# Patient Record
Sex: FEMALE | Race: WHITE | NOT HISPANIC OR LATINO
[De-identification: names, ages, dates, MRNs, and addresses within clinical notes are randomized per-mention and may not be internally consistent; named-entity substitution may affect disease eponyms.]

---

## 2022-10-18 PROBLEM — Z00.00 ENCOUNTER FOR PREVENTIVE HEALTH EXAMINATION: Status: ACTIVE | Noted: 2022-10-18

## 2022-10-20 ENCOUNTER — APPOINTMENT (OUTPATIENT)
Dept: ORTHOPEDIC SURGERY | Facility: CLINIC | Age: 77
End: 2022-10-20

## 2022-10-20 VITALS — HEIGHT: 64.5 IN | BODY MASS INDEX: 25.13 KG/M2 | WEIGHT: 149 LBS

## 2022-10-20 DIAGNOSIS — E78.00 PURE HYPERCHOLESTEROLEMIA, UNSPECIFIED: ICD-10-CM

## 2022-10-20 DIAGNOSIS — Z86.39 PERSONAL HISTORY OF OTHER ENDOCRINE, NUTRITIONAL AND METABOLIC DISEASE: ICD-10-CM

## 2022-10-20 DIAGNOSIS — I10 ESSENTIAL (PRIMARY) HYPERTENSION: ICD-10-CM

## 2022-10-20 PROCEDURE — 99214 OFFICE O/P EST MOD 30 MIN: CPT | Mod: 1L

## 2022-10-20 PROCEDURE — 99072 ADDL SUPL MATRL&STAF TM PHE: CPT | Mod: 1L

## 2022-10-20 RX ORDER — FAMOTIDINE 20 MG/1
20 TABLET, FILM COATED ORAL
Refills: 0 | Status: ACTIVE | COMMUNITY

## 2022-10-20 RX ORDER — ATORVASTATIN CALCIUM 10 MG/1
10 TABLET, FILM COATED ORAL
Refills: 0 | Status: ACTIVE | COMMUNITY

## 2022-10-20 RX ORDER — AMLODIPINE BESYLATE 5 MG/1
5 TABLET ORAL
Refills: 0 | Status: ACTIVE | COMMUNITY

## 2022-10-20 RX ORDER — HYDROXYZINE HYDROCHLORIDE 50 MG/1
50 TABLET ORAL
Refills: 0 | Status: ACTIVE | COMMUNITY

## 2022-10-20 RX ORDER — METHOCARBAMOL 500 MG/1
500 TABLET, FILM COATED ORAL
Refills: 0 | Status: ACTIVE | COMMUNITY

## 2022-10-20 RX ORDER — LEVOTHYROXINE SODIUM 0.07 MG/1
75 TABLET ORAL
Refills: 0 | Status: ACTIVE | COMMUNITY

## 2022-10-30 ENCOUNTER — FORM ENCOUNTER (OUTPATIENT)
Age: 77
End: 2022-10-30

## 2022-11-17 ENCOUNTER — FORM ENCOUNTER (OUTPATIENT)
Age: 77
End: 2022-11-17
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
## 2022-12-07 NOTE — DATA REVIEWED
[MRI] : MRI [Cervical Spine] : cervical spine [Lumbar Spine] : lumbar spine [Report was reviewed and noted in the chart] : The report was reviewed and noted in the chart [I independently reviewed and interpreted images and report] : I independently reviewed and interpreted images and report [FreeTextEntry1] : Cervical:\par Multilevel cervical spondylosis \par HNP C5-6\par \par Lumbar:\par Multilevel lumbar spondylosis \par left foraminal HNP L2-3\par Central HNP L5-S1\par Left paracentral HNP L4-5

## 2022-12-07 NOTE — ASSESSMENT
[FreeTextEntry1] : Cervical:\par Multilevel cervical spondylosis \par HNP C5-6\par \par Lumbar:\par Multilevel lumbar spondylosis \par left foraminal HNP L2-3\par Central HNP L5-S1\par Left paracentral HNP L4-5

## 2022-12-15 ENCOUNTER — APPOINTMENT (OUTPATIENT)
Dept: ORTHOPEDIC SURGERY | Facility: CLINIC | Age: 77
End: 2022-12-15

## 2022-12-15 PROCEDURE — 99214 OFFICE O/P EST MOD 30 MIN: CPT

## 2022-12-15 PROCEDURE — 99072 ADDL SUPL MATRL&STAF TM PHE: CPT
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
## 2022-12-15 NOTE — ASSESSMENT
[FreeTextEntry1] : Cervical:\par Multilevel cervical spondylosis \par HNP C5-6\par \par Lumbar:\par Multilevel lumbar spondylosis \par left foraminal HNP L2-3\par Central HNP L5-S1\par Left paracentral HNP L4-5\par \par Continue PT \par \par Referral to pain management for injections, follow up 2 weeks after injection. \par \par Recommend: - NSAID - Heating pad - Muscle relaxer - Neck stretching exercise - Soft cervical collar - Cervical traction Patient is given neck rehabilitation exercise book. \par \par Recommend: - NSAID - Heating pad - Muscle relaxer - Core strengthening exercise - Hamstring stretching exercise Patient is given back rehabilitation exercise book. \par \par Follow up in 2 months \par

## 2022-12-20 ENCOUNTER — FORM ENCOUNTER (OUTPATIENT)
Age: 77
End: 2022-12-20

## 2023-01-02 NOTE — DATA REVIEWED
[MRI] : MRI [Cervical Spine] : cervical spine [Report was reviewed and noted in the chart] : The report was reviewed and noted in the chart [I reviewed the films/CD] : I reviewed the films/CD [Lumbar Spine] : lumbar spine

## 2023-01-03 ENCOUNTER — APPOINTMENT (OUTPATIENT)
Dept: PAIN MANAGEMENT | Facility: CLINIC | Age: 78
End: 2023-01-03
Payer: COMMERCIAL

## 2023-01-03 VITALS — WEIGHT: 149 LBS | BODY MASS INDEX: 25.44 KG/M2 | HEIGHT: 64 IN

## 2023-01-03 DIAGNOSIS — M47.817 SPONDYLOSIS W/OUT MYELOPATHY OR RADICULOPATHY, LUMBOSACRAL REGION: ICD-10-CM

## 2023-01-03 PROCEDURE — 99204 OFFICE O/P NEW MOD 45 MIN: CPT

## 2023-01-03 PROCEDURE — 99072 ADDL SUPL MATRL&STAF TM PHE: CPT

## 2023-01-03 NOTE — PHYSICAL EXAM
[de-identified] : Constitutional:  \par - No acute distress  \par - Well developed; well nourished  \par \par Neurological:  \par - normal mood and affect  \par - alert and oriented x 3   \par \par Cardiovascular:  \par - grossly normal \par \par Lumbar Spine Exam: \par \par Inspection: \par erythema (-) \par ecchymosis (-) \par rashes (-) \par alignment: Exaggerated thoracic kyphosis\par \par Palpation: \par Midline lumbar tenderness:            (+) \par midline thoracic tenderness:          (-) \par Lumbar paraspinal tenderness:  L (-) ; R (+) \par thoracic paraspinal tenderness: L (-) ; R (-) \par sciatic nerve tenderness :          L (-) ; R (-) \par SI joint tenderness:                     L (-) ; R (-) \par GTB tenderness:                        L (-);  R (-) \par \par ROM:  Reduced ROM all planes with mild stiffness \par \par Strength: \par                                    Right       Left    \par Hip Flexion:                (5/5)       (5/5) \par Quadriceps:               (5/5)       (5/5) \par Hamstrings:                (5/5)       (5/5) \par Ankle Dorsiflexion:     (5/5)       (5/5) \par EHL:                           (5/5)       (5/5) \par Ankle Plantarflexion:  (5/5)       (5/5) \par \par Special Tests: \par SLR:                            R (EQ) ; L (-) \par Facet loading:             R (+) ; L (-) \par DARIO test:                R (EQ) ; L (-) \par Hamstring tightness:   R (+);  L (+) \par \par Neurologic: \par SILT throughout right lower extremity \par SILT throughout left lower extremity \par \par Reflexes normal and symmetric bilateral lower extremities \par \par Gait: \par Mildly antalgic gait \par ambulates without assistive device
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
## 2023-01-03 NOTE — ASSESSMENT
[FreeTextEntry1] : A discussion regarding available pain management treatment options occurred with the patient.  These included interventional, rehabilitative, pharmacological, and alternative modalities. We will proceed with the following:  \par \par Interventional treatment options:  \par - Proceed with right L4-L5, L5-S1 TFESI with fluoroscopic guidance\par - We will consider facet directed intervention if ongoing axial low back pain\par - Possible right IA hip injection if suspected pain generator\par - see additional instructions below  \par \par Rehabilitative options:  \par - continue physical therapy  \par - participation in active HEP was discussed  \par \par Medication based treatment options:  \par - initiate trial of naproxen OTC BID as needed\par - continue Tylenol 500-1000 mg TID as needed\par - see additional instructions below  \par \par Complementary treatment options:  \par - Weight management and lifestyle modifications discussed  \par - See additional instructions below  \par \par Additional treatment recommendations as follows:  \par - patient with follow up with Dr. Cintron as directed\par - X-ray right hip if ongoing suspicion for intra-articular pain generator\par - Follow up 1-2 weeks post injection for assessment of efficacy and further recommendations.\par \par We have discussed the risks, benefits, and alternatives NSAID therapy including but not limited to the risk of bleeding, thrombosis, gastric mucosal irritation/ulceration, allergic reaction and kidney dysfunction; the patient verbalizes an understanding.\par \par The risks, benefits and alternatives of the proposed procedure were explained in detail with the patient. The risks outlined include but are not limited to infection, bleeding, post- dural puncture headache, nerve injury, a temporary increase in pain, failure to resolve symptoms, allergic reaction, and possible elevation of blood sugar in diabetics if using corticosteroid.  All questions were answered to patient's apparent satisfaction and he/she verbalized an understanding.\par \par I, Lukas Bryan acting as scribe, attest that this documentation has been prepared under the direction and in the presence of Provider Mo Noe DO. \par \par The documentation recorded by the scribe, in my presence, accurately reflects the service I personally performed and the decisions made by me with my edits as appropriate.

## 2023-01-17 ENCOUNTER — FORM ENCOUNTER (OUTPATIENT)
Age: 78
End: 2023-01-17

## 2023-02-06 ENCOUNTER — FORM ENCOUNTER (OUTPATIENT)
Age: 78
End: 2023-02-06

## 2023-02-16 ENCOUNTER — APPOINTMENT (OUTPATIENT)
Dept: ORTHOPEDIC SURGERY | Facility: CLINIC | Age: 78
End: 2023-02-16
Payer: COMMERCIAL

## 2023-02-16 VITALS — WEIGHT: 149 LBS | HEIGHT: 64 IN | BODY MASS INDEX: 25.44 KG/M2

## 2023-02-16 DIAGNOSIS — M47.816 SPONDYLOSIS W/OUT MYELOPATHY OR RADICULOPATHY, LUMBAR REGION: ICD-10-CM

## 2023-02-16 PROCEDURE — 99072 ADDL SUPL MATRL&STAF TM PHE: CPT

## 2023-02-16 PROCEDURE — 99215 OFFICE O/P EST HI 40 MIN: CPT | Mod: 25

## 2023-02-16 PROCEDURE — 20610 DRAIN/INJ JOINT/BURSA W/O US: CPT | Mod: RT

## 2023-02-16 PROCEDURE — 73502 X-RAY EXAM HIP UNI 2-3 VIEWS: CPT

## 2023-02-16 NOTE — IMAGING
[Right] : right hip with pelvis [AP] : anteroposterior [Lateral] : lateral [FreeTextEntry9] : EVETTE

## 2023-02-16 NOTE — ASSESSMENT
[FreeTextEntry1] : Cervical:\par Multilevel cervical spondylosis \par HNP C5-6\par \par Lumbar:\par Multilevel lumbar spondylosis \par left foraminal HNP L2-3\par Central HNP L5-S1\par Left paracentral HNP L4-5\par \par Continue PT \par \par Patient waiting for PM to schedule cervical and lumbar SHAWANDA.\par \par Recommend: - NSAID - Heating pad - Muscle relaxer - Neck stretching exercise - Soft cervical collar - Cervical traction Patient is given neck rehabilitation exercise book. \par \par Recommend: - NSAID - Heating pad - Muscle relaxer - Core strengthening exercise - Hamstring stretching exercise Patient is given back rehabilitation exercise book. \par \par Follow up in 2 months \par

## 2023-02-16 NOTE — PROCEDURE
[Large Joint Injection] : Large joint injection [Right] : of the right [Greater Trochanteric Bursa] : greater trochanteric bursa [Pain] : pain [Inflammation] : inflammation [X-ray evidence of Osteoarthritis on this or prior visit] : x-ray evidence of Osteoarthritis on this or prior visit [Betadine] : betadine [Ethyl Chloride sprayed topically] : ethyl chloride sprayed topically [Sterile technique used] : sterile technique used [___ cc    1%] : Lidocaine ~Vcc of 1%  [___ cc    40mg] : Triamcinolone (Kenalog) ~Vcc of 40 mg  [] : Patient tolerated procedure well [Call if redness, pain or fever occur] : call if redness, pain or fever occur [Apply ice for 15min out of every hour for the next 12-24 hours as tolerated] : apply ice for 15 minutes out of every hour for the next 12-24 hours as tolerated [Patient was advised to rest the joint(s) for ____ days] : patient was advised to rest the joint(s) for [unfilled] days [Previous OTC use and PT nontherapeutic] : patient has tried OTC's including aspirin, Ibuprofen, Aleve, etc or prescription NSAIDS, and/or exercises at home and/or physical therapy without satisfactory response [Patient had decreased mobility in the joint] : patient had decreased mobility in the joint [Risks, benefits, alternatives discussed / Verbal consent obtained] : the risks benefits, and alternatives have been discussed, and verbal consent was obtained
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
## 2023-03-27 ENCOUNTER — FORM ENCOUNTER (OUTPATIENT)
Age: 78
End: 2023-03-27

## 2023-05-12 ENCOUNTER — APPOINTMENT (OUTPATIENT)
Dept: ORTHOPEDIC SURGERY | Facility: CLINIC | Age: 78
End: 2023-05-12
Payer: COMMERCIAL

## 2023-05-12 VITALS — HEIGHT: 64 IN | WEIGHT: 149 LBS | BODY MASS INDEX: 25.44 KG/M2

## 2023-05-12 PROCEDURE — 99215 OFFICE O/P EST HI 40 MIN: CPT
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
## 2023-05-12 NOTE — ASSESSMENT
[FreeTextEntry1] : Cervical:\par Multilevel cervical spondylosis \par HNP C5-6\par \par Lumbar:\par Multilevel lumbar spondylosis \par left foraminal HNP L2-3\par Central HNP L5-S1\par Left paracentral HNP L4-5\par \par Continue PT\par \par Recommend cervical SHAWANDA  \par \par Recommend: - NSAID - Heating pad - Muscle relaxer - Neck stretching exercise - Soft cervical collar - Cervical traction Patient is given neck rehabilitation exercise book. \par \par Recommend: - NSAID - Heating pad - Muscle relaxer - Core strengthening exercise - Hamstring stretching exercise Patient is given back rehabilitation exercise book. \par \par Follow up in 2 months \par

## 2023-08-11 ENCOUNTER — APPOINTMENT (OUTPATIENT)
Dept: ORTHOPEDIC SURGERY | Facility: CLINIC | Age: 78
End: 2023-08-11
Payer: COMMERCIAL

## 2023-08-11 VITALS — HEIGHT: 64 IN | WEIGHT: 149 LBS | BODY MASS INDEX: 25.44 KG/M2

## 2023-08-11 DIAGNOSIS — S13.9XXA SPRAIN OF JOINTS AND LIGAMENTS OF UNSPECIFIED PARTS OF NECK, INITIAL ENCOUNTER: ICD-10-CM

## 2023-08-11 DIAGNOSIS — S33.5XXA SPRAIN OF LIGAMENTS OF LUMBAR SPINE, INITIAL ENCOUNTER: ICD-10-CM

## 2023-08-11 PROCEDURE — 99214 OFFICE O/P EST MOD 30 MIN: CPT

## 2023-08-11 RX ORDER — DICLOFENAC SODIUM 1% 10 MG/G
1 GEL TOPICAL 4 TIMES DAILY
Qty: 1 | Refills: 0 | Status: ACTIVE | COMMUNITY
Start: 2023-08-11 | End: 1900-01-01

## 2023-08-11 NOTE — ASSESSMENT
[FreeTextEntry1] : Cervical: Multilevel cervical spondylosis  HNP C5-6  Lumbar: Multilevel lumbar spondylosis  Left foraminal HNP L2-3 Central HNP L5-S1 Left paracentral HNP L4-5  Continue HEP  Continue Acupuncture treatment.  Recommend: - NSAID - Heating pad - Muscle relaxer - Neck stretching exercise - Soft cervical collar - Cervical traction Patient is given neck rehabilitation exercise book.   Recommend: - NSAID - Heating pad - Muscle relaxer - Core strengthening exercise - Hamstring stretching exercise Patient is given back rehabilitation exercise book.   Follow up in 2 months
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
## 2023-10-12 ENCOUNTER — APPOINTMENT (OUTPATIENT)
Dept: ORTHOPEDIC SURGERY | Facility: CLINIC | Age: 78
End: 2023-10-12
Payer: COMMERCIAL

## 2023-10-12 VITALS — HEIGHT: 64 IN | BODY MASS INDEX: 25.44 KG/M2 | WEIGHT: 149 LBS

## 2023-10-12 PROCEDURE — 99215 OFFICE O/P EST HI 40 MIN: CPT

## 2023-12-14 ENCOUNTER — APPOINTMENT (OUTPATIENT)
Dept: ORTHOPEDIC SURGERY | Facility: CLINIC | Age: 78
End: 2023-12-14
Payer: COMMERCIAL

## 2023-12-14 PROCEDURE — 99214 OFFICE O/P EST MOD 30 MIN: CPT
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
## 2023-12-14 NOTE — ASSESSMENT
[FreeTextEntry1] : MRI of C-Spine was reviewed today and is as follows: Multilevel cervical spondylosis HNP C5-6  MRI of L-Spine was reviewed today and is as follows: Multilevel lumbar spondylosis Left foraminal HNP L2-3 Central HNP L5-S1 Left paracentral HNP L4-5  78 yo female presents today for follow up on her neck and back. She states that she feels improvement with her current management of medications and acupuncture. Discussed with patient that she should continue with this management as it is bringing her relief.   - Discussed with patient that she may decrease her gabapentin to qhs at this time.   - Rx given for voltaren today, use as needed for pain  - Patient given referral to acupuncture for evaluation and treatment.  - Recommend NSAIDs PRN - Recommend heating pad use to decrease muscle spasm - Discussed the importance of home exercises, including but not limited to neck stretching and cervical traction. hamstring stretch and core strengthen  Patient was educated on their diagnosis today. All questions answered and patient expressed understanding.  Follow up in 2 months.

## 2024-02-15 ENCOUNTER — APPOINTMENT (OUTPATIENT)
Dept: ORTHOPEDIC SURGERY | Facility: CLINIC | Age: 79
End: 2024-02-15
Payer: COMMERCIAL

## 2024-02-15 PROCEDURE — 99214 OFFICE O/P EST MOD 30 MIN: CPT

## 2024-02-15 NOTE — HISTORY OF PRESENT ILLNESS
[de-identified] : F/U Cervical and Lumbar spine  Pt reports improvement since last visit Reports going to Acupuncture 2-3x/week and taking Gabapentin with relief- states she needs a refill  Reports using Voltaren get with some relief  Pt reports soreness in right hip radiating into groin  Pt had Prolia injection for Osteoporosis x~1 month ago

## 2024-02-15 NOTE — ASSESSMENT
[FreeTextEntry1] : MRI of C-Spine was reviewed today and is as follows: Multilevel cervical spondylosis HNP C5-6  MRI of L-Spine was reviewed today and is as follows: Multilevel lumbar spondylosis Left foraminal HNP L2-3 Central HNP L5-S1 Left paracentral HNP L4-5  76 yo female presents today for follow up on her neck and back. She states that she feels improvement with her current management of medications and acupuncture. Patient with some persistent soreness in both her neck and back. Discussed with patient that she may benefit from an SHAWANDA in her neck and back for relief. However, patient would like to proceed with her current management.   - Patient given referral to acupuncture for evaluation and treatment.   - Patient given a refill for gabapentin 300mg qhs today for their nerve pain. Advised patient on potential side effects, including drowsiness.   - Recommend NSAIDs PRN - Recommend heating pad use to decrease muscle spasm - Discussed the importance of home exercises, including but not limited to neck stretching and cervical traction. hamstring stretch and core strengthen  Patient was educated on their diagnosis today. All questions answered and patient expressed understanding.  Follow up in 2 months.

## 2024-04-18 ENCOUNTER — APPOINTMENT (OUTPATIENT)
Dept: ORTHOPEDIC SURGERY | Facility: CLINIC | Age: 79
End: 2024-04-18
Payer: MEDICARE

## 2024-04-18 PROCEDURE — 99214 OFFICE O/P EST MOD 30 MIN: CPT

## 2024-04-18 RX ORDER — GABAPENTIN 300 MG/1
300 CAPSULE ORAL
Qty: 30 | Refills: 1 | Status: ACTIVE | COMMUNITY
Start: 2023-10-12 | End: 1900-01-01

## 2024-04-18 RX ORDER — DICLOFENAC SODIUM 1% 10 MG/G
1 GEL TOPICAL 4 TIMES DAILY
Qty: 1 | Refills: 1 | Status: ACTIVE | COMMUNITY
Start: 2023-10-12 | End: 1900-01-01

## 2024-04-18 NOTE — ASSESSMENT
[FreeTextEntry1] : MRI of C-Spine was reviewed today and is as follows: Multilevel cervical spondylosis HNP C5-6  MRI of L-Spine was reviewed today and is as follows: Multilevel lumbar spondylosis Left foraminal HNP L2-3 Central HNP L5-S1 Left paracentral HNP L4-5  76 yo female presents today for follow up on her neck and back. Patient with persistent pain in both her neck and back at this time. I discussed with patient that she may benefit from SHAWANDA in both her neck and back this time for relief.   - Patient given referral to acupuncture for evaluation and treatment.   - Patient given a refill for gabapentin 300mg qhs today for their nerve pain. Advised patient on potential side effects, including drowsiness.   - Recommend NSAIDs PRN - Recommend heating pad use to decrease muscle spasm - Discussed the importance of home exercises, including but not limited to neck stretching and cervical traction. hamstring stretch and core strengthen  Patient was educated on their diagnosis today. All questions answered and patient expressed understanding.  Follow up in 2 months.

## 2024-04-18 NOTE — HISTORY OF PRESENT ILLNESS
[de-identified] : F/U Cervical and Lumbar spine  Pt reports little improvement since last visit- reports continued soreness in neck and back Reports pain radiating into hips R>L- some relief with Voltaren gel Reports taking Gabapentin with some relief.  Pt reports no longer going to acupuncture due to insurance

## 2024-06-13 ENCOUNTER — APPOINTMENT (OUTPATIENT)
Dept: ORTHOPEDIC SURGERY | Facility: CLINIC | Age: 79
End: 2024-06-13
Payer: MEDICARE

## 2024-06-13 DIAGNOSIS — M54.16 RADICULOPATHY, LUMBAR REGION: ICD-10-CM

## 2024-06-13 DIAGNOSIS — M62.838 OTHER MUSCLE SPASM: ICD-10-CM

## 2024-06-13 DIAGNOSIS — M47.22 OTHER SPONDYLOSIS WITH RADICULOPATHY, CERVICAL REGION: ICD-10-CM

## 2024-06-13 DIAGNOSIS — M16.11 UNILATERAL PRIMARY OSTEOARTHRITIS, RIGHT HIP: ICD-10-CM

## 2024-06-13 DIAGNOSIS — M50.220 OTHER CERVICAL DISC DISPLACEMENT, MID-CERVICAL REGION, UNSPECIFIED LEVEL: ICD-10-CM

## 2024-06-13 DIAGNOSIS — M48.02 SPINAL STENOSIS, CERVICAL REGION: ICD-10-CM

## 2024-06-13 DIAGNOSIS — M48.061 SPINAL STENOSIS, LUMBAR REGION WITHOUT NEUROGENIC CLAUDICATION: ICD-10-CM

## 2024-06-13 DIAGNOSIS — M50.320 OTHER CERVICAL DISC DEGENERATION, MID-CERVICAL REGION, UNSPECIFIED LEVEL: ICD-10-CM

## 2024-06-13 DIAGNOSIS — M51.37 OTHER INTERVERTEBRAL DISC DEGENERATION, LUMBOSACRAL REGION: ICD-10-CM

## 2024-06-13 DIAGNOSIS — M51.36 OTHER INTERVERTEBRAL DISC DEGENERATION, LUMBAR REGION: ICD-10-CM

## 2024-06-13 DIAGNOSIS — M70.61 TROCHANTERIC BURSITIS, RIGHT HIP: ICD-10-CM

## 2024-06-13 DIAGNOSIS — M54.12 RADICULOPATHY, CERVICAL REGION: ICD-10-CM

## 2024-06-13 DIAGNOSIS — M47.812 SPONDYLOSIS W/OUT MYELOPATHY OR RADICULOPATHY, CERVICAL REGION: ICD-10-CM

## 2024-06-13 PROCEDURE — 99213 OFFICE O/P EST LOW 20 MIN: CPT

## 2024-06-13 NOTE — HISTORY OF PRESENT ILLNESS
[de-identified] : Follow up cervical and lumbar spine. Patient states she feels some improvement since last visit. Patient states she has intermittent pain. Patient admits to taking Gabapentin with some relief. Patient admits to using Voltaren gel with some relief.   Today's Pain Neck 4/10 Lower Back 7/10

## 2024-06-13 NOTE — ASSESSMENT
[FreeTextEntry1] : MRI of C-Spine was reviewed today and is as follows: Multilevel cervical spondylosis HNP C5-6  MRI of L-Spine was reviewed today and is as follows: Multilevel lumbar spondylosis Left foraminal HNP L2-3 Central HNP L5-S1 Left paracentral HNP L4-5  78 yo female presents today for follow up on her neck and back. Patient with some continued pain in her back, neck with some improvement. Options discussed with patient including injection, PT, and medication management. patient wishes to watch and wait at this time.    - Patient will continue HEP as detailed in home exercise booklet given in office. Emphasized daily stretching and strengthening.   - Continue gabapentin and Voltaren  - Recommend NSAIDs PRN - Recommend heating pad use to decrease muscle spasm - Discussed the importance of home exercises, including but not limited to neck stretching and cervical traction. hamstring stretch and core strengthen  Patient was educated on their diagnosis today. All questions answered and patient expressed understanding.  Follow up in 2 months.

## 2024-08-09 ENCOUNTER — APPOINTMENT (OUTPATIENT)
Dept: ORTHOPEDIC SURGERY | Facility: CLINIC | Age: 79
End: 2024-08-09

## 2024-08-09 ENCOUNTER — RESULT CHARGE (OUTPATIENT)
Age: 79
End: 2024-08-09

## 2024-08-09 PROBLEM — M54.12 RIGHT CERVICAL RADICULOPATHY: Status: RESOLVED | Noted: 2022-10-20 | Resolved: 2024-08-09

## 2024-08-09 PROBLEM — M62.838 CERVICAL PARASPINAL MUSCLE SPASM: Status: RESOLVED | Noted: 2022-10-20 | Resolved: 2024-08-09

## 2024-08-09 PROBLEM — S33.5XXA LUMBAR SPRAIN: Status: RESOLVED | Noted: 2022-10-20 | Resolved: 2024-08-09

## 2024-08-09 PROBLEM — S13.9XXA CERVICAL SPRAIN, INITIAL ENCOUNTER: Status: RESOLVED | Noted: 2022-10-20 | Resolved: 2024-08-09

## 2024-08-09 PROBLEM — M54.12 LEFT CERVICAL RADICULOPATHY: Status: RESOLVED | Noted: 2022-10-20 | Resolved: 2024-08-09

## 2024-08-09 PROBLEM — M54.12 BILATERAL CERVICAL RADICULOPATHY: Status: ACTIVE | Noted: 2024-08-09

## 2024-08-09 PROCEDURE — 99214 OFFICE O/P EST MOD 30 MIN: CPT

## 2024-08-09 NOTE — ASSESSMENT
[FreeTextEntry1] : MRI of C-Spine was reviewed today and is as follows: Multilevel cervical spondylosis HNP C5-6  MRI of L-Spine was reviewed today and is as follows: Multilevel lumbar spondylosis Left foraminal HNP L2-3 Central HNP L5-S1 Left paracentral HNP L4-5  78 yo female presents today for follow up on her neck and back. XR today show advanced degenerative changes of both regions. I recommend proceeding with new MRI given persistent pain in both regions.   - Patient given prescription for cervical and lumbar MRI, follow up after study is completed to discuss results.   - Recommend physical therapy to regain range of motion, strengthening and symptomatic improvement. Prescription given in office today.   - Prescription given for Voltaren gel today. Advised patient to apply to the area of pain as needed.   - Recommend NSAIDs PRN - Recommend heating pad use to decrease muscle spasm - Discussed the importance of home exercises, including but not limited to neck stretching and cervical traction. hamstring stretch and core strengthen  Patient was educated on their diagnosis today. All questions answered and patient expressed understanding.  Follow up in 2 months.

## 2024-08-09 NOTE — HISTORY OF PRESENT ILLNESS
[de-identified] : Follow up cervical and lumbar spine. Patient states her neck pain is improving. Patient states her lower back pain radiates into the right hip and groin. Patient admits to taking Gabapentin with some relief. Patient admits to using Voltaren gel with some relief.   Today's Pain Neck 6/10 Lower Back 7-8/10

## 2024-08-12 ENCOUNTER — RESULT REVIEW (OUTPATIENT)
Age: 79
End: 2024-08-12

## 2024-09-19 ENCOUNTER — APPOINTMENT (OUTPATIENT)
Dept: ORTHOPEDIC SURGERY | Facility: CLINIC | Age: 79
End: 2024-09-19
Payer: MEDICARE

## 2024-09-19 DIAGNOSIS — M47.22 OTHER SPONDYLOSIS WITH RADICULOPATHY, CERVICAL REGION: ICD-10-CM

## 2024-09-19 DIAGNOSIS — M51.37 OTHER INTERVERTEBRAL DISC DEGENERATION, LUMBOSACRAL REGION: ICD-10-CM

## 2024-09-19 DIAGNOSIS — M48.02 SPINAL STENOSIS, CERVICAL REGION: ICD-10-CM

## 2024-09-19 DIAGNOSIS — M54.16 RADICULOPATHY, LUMBAR REGION: ICD-10-CM

## 2024-09-19 DIAGNOSIS — M50.220 OTHER CERVICAL DISC DISPLACEMENT, MID-CERVICAL REGION, UNSPECIFIED LEVEL: ICD-10-CM

## 2024-09-19 DIAGNOSIS — M50.320 OTHER CERVICAL DISC DEGENERATION, MID-CERVICAL REGION, UNSPECIFIED LEVEL: ICD-10-CM

## 2024-09-19 DIAGNOSIS — M54.12 RADICULOPATHY, CERVICAL REGION: ICD-10-CM

## 2024-09-19 DIAGNOSIS — M51.36 OTHER INTERVERTEBRAL DISC DEGENERATION, LUMBAR REGION: ICD-10-CM

## 2024-09-19 DIAGNOSIS — M48.061 SPINAL STENOSIS, LUMBAR REGION WITHOUT NEUROGENIC CLAUDICATION: ICD-10-CM

## 2024-09-19 DIAGNOSIS — M47.812 SPONDYLOSIS W/OUT MYELOPATHY OR RADICULOPATHY, CERVICAL REGION: ICD-10-CM

## 2024-09-19 PROCEDURE — 99214 OFFICE O/P EST MOD 30 MIN: CPT

## 2024-09-19 NOTE — HISTORY OF PRESENT ILLNESS
[de-identified] : Body part: neck and lower back Better/ Worse/ Same since last visit: better Treatments since last visit: cervical and lumbar spine MRIs at , Denies starting PT Radicular symptoms:  neck pain into the right shoulder, lower back pain into the right hip and groin Paresthesia: none Current medications for pain: Gabapentin, Voltaren gel Assistive walking device: none  Today's Pain: Neck 5/10 Lower Back 7/10

## 2024-09-19 NOTE — DATA REVIEWED
[Cervical Spine] : cervical spine [MRI] : MRI [Lumbar Spine] : lumbar spine [I independently reviewed and interpreted images and report] : I independently reviewed and interpreted images and report [FreeTextEntry1] : 8/2024 MRI of C-Spine was reviewed today and is as follows:  Multilevel spondylosis Moderate to severe stenosis C3-4 Moderate stenosis C4-5, C5-6  8/2024 MRI of L-Spine was reviewed today and is as follows:  Left lateral recess stenosis L3-4 Severe stenosis L4-5 Broad HNP L5-S1

## 2024-09-19 NOTE — ASSESSMENT
[FreeTextEntry1] : 8/2024 MRI of C-Spine was reviewed today and is as follows:  Multilevel spondylosis Moderate to severe stenosis C3-4 Moderate stenosis C4-5, C5-6  8/2024 MRI of L-Spine was reviewed today and is as follows:  Left lateral recess stenosis L3-4 Severe stenosis L4-5 Broad HNP L5-S1  76 yo female presents today for follow up on her neck and back. MRI detailed above shows stenosis of both regions. Options discussed including PT vs SHAWANDA. Patient has elected to proceed with PT at this time.   - Continue physical therapy to regain range of motion, strengthening and symptomatic improvement. Prescription given in office today.   - Recommend NSAIDs PRN - Recommend heating pad use to decrease muscle spasm - Discussed the importance of home exercises, including but not limited to neck stretching and cervical traction. hamstring stretch and core strengthen  Patient was educated on their diagnosis today. All questions answered and patient expressed understanding.  Follow up PRN